# Patient Record
Sex: MALE | Race: WHITE | NOT HISPANIC OR LATINO | URBAN - METROPOLITAN AREA
[De-identification: names, ages, dates, MRNs, and addresses within clinical notes are randomized per-mention and may not be internally consistent; named-entity substitution may affect disease eponyms.]

---

## 2019-11-08 ENCOUNTER — OFFICE (OUTPATIENT)
Dept: URBAN - METROPOLITAN AREA CLINIC 101 | Facility: CLINIC | Age: 34
End: 2019-11-08

## 2019-11-08 VITALS
WEIGHT: 183 LBS | HEART RATE: 82 BPM | HEIGHT: 69 IN | DIASTOLIC BLOOD PRESSURE: 89 MMHG | SYSTOLIC BLOOD PRESSURE: 155 MMHG | TEMPERATURE: 98.6 F

## 2019-11-08 DIAGNOSIS — M45.9 ANKYLOSING SPONDYLITIS OF UNSPECIFIED SITES IN SPINE: ICD-10-CM

## 2019-11-08 DIAGNOSIS — R19.4 CHANGE IN BOWEL HABIT: ICD-10-CM

## 2019-11-08 DIAGNOSIS — R10.30 LOWER ABDOMINAL PAIN, UNSPECIFIED: ICD-10-CM

## 2019-11-08 PROCEDURE — 99244 OFF/OP CNSLTJ NEW/EST MOD 40: CPT

## 2019-11-08 PROCEDURE — 00031: CPT

## 2019-11-08 NOTE — SERVICEHPINOTES
SOLIS KRUSE   is a   34   male who presents with stomach pain and frequent bowel movements. BROver the past few months, has been experiencing irregular bowel patterns. + Urgency and lower abdominal cramps. BRMoves bowel 1-2 times daily on BSS type 5-6 with urgency otherwise type 3-4. Denies blood in stool, melena, constipation or weight loss. Denies nausea, vomiting, dysphagia, acid reflux, dyspepsia or early satiety. BRTakes meloxicam, Humira and methotrexate for ankylosing spondylitis. Denies family history of colon cancer or IBD. BRDenies chest pain, palpitations or sob.

## 2019-12-13 ENCOUNTER — OFFICE (OUTPATIENT)
Dept: URBAN - METROPOLITAN AREA PATHOLOGY 17 | Facility: PATHOLOGY | Age: 34
End: 2019-12-13
Payer: COMMERCIAL

## 2019-12-13 ENCOUNTER — OFFICE (OUTPATIENT)
Dept: URBAN - METROPOLITAN AREA CLINIC 100 | Facility: CLINIC | Age: 34
End: 2019-12-13

## 2019-12-13 VITALS
SYSTOLIC BLOOD PRESSURE: 122 MMHG | SYSTOLIC BLOOD PRESSURE: 104 MMHG | HEART RATE: 87 BPM | SYSTOLIC BLOOD PRESSURE: 100 MMHG | HEART RATE: 58 BPM | OXYGEN SATURATION: 100 % | WEIGHT: 183 LBS | TEMPERATURE: 97.7 F | TEMPERATURE: 98.1 F | RESPIRATION RATE: 16 BRPM | OXYGEN SATURATION: 97 % | HEART RATE: 67 BPM | HEIGHT: 69 IN | OXYGEN SATURATION: 98 % | OXYGEN SATURATION: 90 % | HEART RATE: 70 BPM | RESPIRATION RATE: 20 BRPM | HEART RATE: 76 BPM | DIASTOLIC BLOOD PRESSURE: 73 MMHG | DIASTOLIC BLOOD PRESSURE: 52 MMHG | RESPIRATION RATE: 14 BRPM | DIASTOLIC BLOOD PRESSURE: 61 MMHG | SYSTOLIC BLOOD PRESSURE: 95 MMHG | DIASTOLIC BLOOD PRESSURE: 62 MMHG | SYSTOLIC BLOOD PRESSURE: 93 MMHG | RESPIRATION RATE: 18 BRPM | DIASTOLIC BLOOD PRESSURE: 75 MMHG | HEART RATE: 71 BPM | SYSTOLIC BLOOD PRESSURE: 120 MMHG | OXYGEN SATURATION: 99 %

## 2019-12-13 DIAGNOSIS — R19.4 CHANGE IN BOWEL HABIT: ICD-10-CM

## 2019-12-13 DIAGNOSIS — R10.30 LOWER ABDOMINAL PAIN, UNSPECIFIED: ICD-10-CM

## 2019-12-13 DIAGNOSIS — R19.7 DIARRHEA, UNSPECIFIED: ICD-10-CM

## 2019-12-13 PROCEDURE — 88305 TISSUE EXAM BY PATHOLOGIST: CPT

## 2019-12-13 PROCEDURE — 45380 COLONOSCOPY AND BIOPSY: CPT
